# Patient Record
(demographics unavailable — no encounter records)

---

## 2025-03-12 NOTE — OBJECTIVE
[History reviewed] : History reviewed. [Medications and Allergies reviewed] : Medications and allergies reviewed. additional location...

## 2025-03-12 NOTE — CARDIOLOGY SUMMARY
[de-identified] : July 15, 2021 normal sinus rhythm August 15, 2022.  Sinus bradycardia 4/3/24 nsr  3/12/25 sinus bradycardia  [de-identified] : Exercise treadmill stress test.  February 2023.  Nonischemic at 13 minutes 30 seconds.  [facility] protocol.  15 METs of workload. [de-identified] : Echo 2/15/22 EF 60%, mild MR, no sig change since 2019. \par  February 24, 2023LVEF 55 to 60%.  Mild mitral and tricuspid regurgitation.\par   [de-identified] : Carotid US 2/15/22 mild non-obstructive disease\par  \par  Abd US 2/15/22 no evidence of AAA\par  \par  \par

## 2025-03-12 NOTE — PHYSICAL EXAM
[Well Developed] : well developed [No Acute Distress] : no acute distress [Normal S1, S2] : normal S1, S2 [Murmur] : murmur [Clear Lung Fields] : clear lung fields [No Edema] : no edema [Normal Radial B/L] : normal radial B/L [Normal DP B/L] : normal DP B/L [Normal Speech] : normal speech [Alert and Oriented] : alert and oriented [de-identified] : Blood pressure 110/70 heart rate 69 weight 188 pounds saturation 95%

## 2025-03-12 NOTE — REASON FOR VISIT
[Symptom and Test Evaluation] : symptom and test evaluation [Arrhythmia/ECG Abnorrmalities] : arrhythmia/ECG abnormalities [Hyperlipidemia] : hyperlipidemia [Coronary Artery Disease] : coronary artery disease [FreeTextEntry3] : Dr. Hough [FreeTextEntry1] : 72-year-old gentleman was seen in the office for his yearly follow-up consultation.  Since last seen he had no further episodes of chest pain.  His activity mainly limited because of arthritis.  But remains very active otherwise.  I have reviewed his labs. He has no dizziness near syncopal or syncopal event. He has no chest pain PND orthopnea palpitation  He has no significant claudication pain He is here to review further management. He has been taking his aspirin and rosuvastatin I have reviewed labs

## 2025-03-12 NOTE — DISCUSSION/SUMMARY
[FreeTextEntry1] : Assessment and suggestions. #1  CAD.  Native artery.  Native heart.  No angina.  History of .NSTEMI in presence of tachycardia. Nonischemic stress test. Asymptomatic. preserved LV systolic function. Nonsmoker. Has opted for medical management.  Continue aspirin.  Continue statin therapy. We will try to increase it to 20 mg and see if he can tolerate it.  If he can he will have repeat labs checked.   Continue lifestyle and risk factor modifications. If he has any high-grade symptoms would require him to call 911, as well as may need to consider invasive coronary angiography for further evaluation management.  #2 supraventricular tachycardia. he is status  post ablation. Rare brief episode of palpitations. If any, worsening he'll contact us immediately. #3 hyperlipidemia.  Management as discussed above. fasting lipid panel  Goal is less than 70. Try to increase rosuvastatin to 20 mg.  If side effects he will contact us. Does not want to pursue any other pharmacotherapy including Zetia, injectables like PCSK9 inhibitors. #4 hyperglycemia.  Hemoglobin A1c normal.  Prediabetic in the past.  Low carbohydrate diet reviewed.   he has been more aggressive with his carbohydrate intake. 5.  atherosclerotic vascular disease.  His age and gender.  Carotid Doppler study without any significant stenosis mild atherosclerosis abdominal aortic ultrasound no aneurysm. 6. Nonrheumatic mitral aortic regurgitation aortic valve sclerosis.  Follow-up echocardiogram.  Counseling regarding low saturated fat, low carbohydrate intake was reviewed. Active lifestyle and regular. Exercise is along with weight maintenance is advised. I have reviewed above at length. I answered all the questions. Patient verbalized understanding Thank you very much for allowing me to participate in your patient's care. Please feel free to call me for any questions.  Sincerely, Eva Fountain MD,FAC,IZABELLA  [EKG obtained to assist in diagnosis and management of assessed problem(s)] : EKG obtained to assist in diagnosis and management of assessed problem(s)

## 2025-03-12 NOTE — HISTORY OF PRESENT ILLNESS
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  Known ischemic stress myocardial perfusion scan with preserved LV systolic function on medical management.\par  Hyperlipidemia.  on statin therapy.\par  Mild mitral regurgitation. No CHF.

## 2025-03-12 NOTE — ASSESSMENT
[FreeTextEntry1] : Reviewed on April 3, 2024. EKG from today reviewed. Labs which were done March 2024 had shown stable CMP.  Hemoglobin A1c 5.2.  Lipid panel was not done.